# Patient Record
Sex: FEMALE | Race: WHITE | Employment: OTHER | URBAN - METROPOLITAN AREA
[De-identification: names, ages, dates, MRNs, and addresses within clinical notes are randomized per-mention and may not be internally consistent; named-entity substitution may affect disease eponyms.]

---

## 2018-08-23 NOTE — H&P
Jemima Buciobecky  2018 10:47 AM  Location: The [address] [place] [address]  Patient #: [id number]  : 1940   / Language: English / Race: White  Female      History of Present Illness   The patient is a 66year old female who presents for a recheck of Finger Problem. The patient describes having dull ache. The problem is described as being located in the left thumb, left index finger (MCP joint) and left long finger (recheck for possible left index and long metacarpal phalangeal joint arthroplasties. on 2018.). The symptom has been occuring for months. The symptom occurs in a persistent pattern. The course has been worsening. The finger problem is described as moderate. The finger problem is aggravated by other (everyday use). The finger problem is relieved by medication (Advil). The symptoms have been associated with painful ROM,  while the symptoms have not been associated with decreased ROM, numbness, tingling or swelling. The patient's dominant hand is their right. Note: I have reviewed the patient's reason for visit, review of systems, and past medical and social history as documented by my staff.  Pertinent items were discussed with the patient. Problem List/Past Medical   Thyroid cancer (193  C73)    Pacemaker (V45.01  Z95.0)    Hypercholesteremia (272.0  E78.00)    Arthritis (716.90) (716.90  M19.90)    Aortic aneurysm (441.9  I71. 9)   just being watched right now  Smoking history (V15.82  Z72.0)    Trigger finger (727.03) (727.03  M65.30)    Tenosynovitis of finger, hand, or wrist (727.05  M65.9)    Acute bronchitis (466.0  J20. 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     Family History   Family history unknown      Social History   No Drug Use    Alcohol Use   Drinks hard liquor. 2/week  Tobacco Use   Current every day smoker, Has been smoking for 30+ years, Smokes 1 pack of cigarettes per day. Medication History   AmLODIPine Besylate  (2.5MG Tablet, Oral every other day) Active. Gabapentin  (300MG Capsule, Oral prn) Active. Lipitor  (40MG Tablet, Oral daily) Active. Synthroid  (100MCG Tablet, Oral daily) Active. Vitamin D  (2000UNIT Tablet, Oral daily) Active. Zoloft  (100MG Tablet, Oral daily) Active. Medications Reconciled     Past Surgical History   Carpal Tunnel Release    Joint Replacement-Right Hand   index and long MCP joints  Carpal Tunnel Surgery - Left   Date: 11/29/2011. Knee replacement (V43.65)   Left. Diagnostic Studies History   EMG/NCS   Date: 10/31/2011, Results: . This is an abnormal study with electrical evidence of a lesion affecting the sensory and motor fibers of the left median nerve at the level of the wrist, as would be seen in a moderately severe left carpal tunnel syndrome. Hand X-ray   Date: 7/13/2011, Results: Kaleb Spies Mild narrowing of the first and third metacarpophalangeal joints of the right hand. No significant joint space narrowing of the left wrist and hand. No joint erosions are identified. Review of Systems   General Not Present- Chills and Fever. Skin Not Present- Bruising, Pallor and Skin Color Changes. Respiratory Not Present- Cough and Difficulty Breathing. Cardiovascular Not Present- Chest Pain and Fainting / Blacking Out. Musculoskeletal Present- Decreased Range of Motion, Joint Pain and Joint Swelling. Neurological Not Present- Dysesthesia, Paresthesias and Weakness In Extremities. Hematology Not Present- Abnormal Bleeding and Petechiae. Physical Exam  General  Mental Status - Alert. General Appearance - Cooperative and Well groomed, Not in acute distress, Not Sickly. Orientation - Oriented X3. Build & Nutrition - Well nourished and Well developed.   Posture - Normal posture. Gait - Normal.  Hydration - Well hydrated. Voice - Normal.    Integumentary  General Characteristics  Color - normal coloration of skin. Skin Moisture - normal skin moisture. Texture - normal skin texture. Chest and Lung Exam  Inspection  Chest Wall - Normal. Shape - Normal and Symmetric. Movements - Symmetrical. Accessory muscles - No use of accessory muscles in breathing. Auscultation  Breath sounds - Normal. Adventitious sounds - No Adventitious sounds. Cardiovascular  Auscultation  Heart Sounds - S1 WNL and S2 WNL, No S3. Murmurs & Other Heart Sounds - Auscultation of the heart reveals - No Murmurs. Abdomen  Inspection - Inspection Normal.    Musculoskeletal  Upper Extremity    Hand/Wrist: Hand - Evaluation of related systems reveals - no digital clubbing or cyanosis and neurovascularly intact bilaterally. Inspection and Palpation - Tenderness - no tenderness to palpation, no pain (L). Crepitus - no crepitus (L). Sensation is - normal, (L). Instability - Left - no instability or laxity. Hand - Deformities/Malalignments/Discrepancies - no deformities, malalignments, or discrepancies. Functional Testing - Grind Test positive, (L). Phalanges:  Left:  Thumb: Inspection and Palpation - Tenderness - moderate and over the basilar joint. Swelling - bony. Thumb - Functional Testing - Flexor Pollicis Longus is intact. Index Finger: Inspection and Palpation - Tenderness - moderate and over the MCP joint. Swelling - boggy and fluid. Index Finger - Functional Testing - Flexor Digitorum superficialis is intact and Flexor Digitorum Profundus is intact. Long Finger: Inspection and Palpation - Tenderness - mild and over the MCP joint. Swelling - boggy and bony. Long Finger - Functional Testing - Flexor Digitorum Superficialis is intact and Flexor Digitorum Profundus is intact. Ring Finger - Functional Testing - Flexor Digitorum Superficialis is intact and Flexor Digitorum Profundus is intact.  Small Finger - Functional Testing - Flexor Digitorum Superficialis is intact and Flexor Digitorum Profundus is intact. Assessment & Plan  Arthralgia of metacarpophalangeal joint, left (719.44  M25.542)  Impression: She has evidence of metacarpophalangeal joint osteoarthritis of index and long. We will proceed with conservative treatment for her loose for some period of time and she is now having increasing symptoms. She would like to proceed with metacarpophalangeal joint arthroplasties. We discussed with her the wrist and benefits of surgery. We discussed failure of implants. We discussed continued pain. She states she understands and agrees to proceed. Current Plans  The procedure was discussed with the patient and a written consent was obtained and questions were answered.  Risks of the procedure were discussed and include but are not limited to infection, bleeding, nerve, vascular injury as well as the need for future procedures.  It was also discussed the importance of compliance with the treatment directions and participation in the care of the treated extremity. Patient wishes to proceed with the planned Left index and long finger metacarpal phalangeal joint arthroplasties. X-RAY EXAM OF HAND (96972) Left Hand 3 Views  X-Ray Findings: Two views of the left hand were obtained.  The radiographs show evidence of basal joint arthritis.  Further there is evidence of metacarpophalangeal joint osteoarthritis of the thumb.  At the metacarpophalangeal joint of index and long there is evidence of osteophyte formation.  There is evidence of sclerosis.  The joint space is narrowed.  Findings are consistent with osteoarthritis. Osteoarthritis of carpometacarpal (CMC) joint of right thumb (715.34  M18.11)  Impression: She has evidence of basal joint arthritis as well as metacarpophalangeal joint osteoarthritis of her thumb. Our plan was to proceed with the metacarpophalangeal joint arthroplasties.  However she is also having increasing symptoms of the thumb and would like to go and proceed with her basilar joint surgery at the same time. She has undergone this previously before on the other side. She would like to proceed with that on the left side. Again we discussed risks and benefits and the fact that she subjecting herself to more pain as well is increased risk of swelling. She states she understands and agrees to proceed. Current Plans  The procedure was discussed with the patient and a written consent was obtained and questions were answered.  Risks of the procedure were discussed and include but are not limited to infection, bleeding, nerve, vascular injury as well as the need for future procedures.  It was also discussed the importance of compliance with the treatment directions and participation in the care of the treated extremity. Patient wishes to proceed with the planned basilar joint arthroplasty. List of current medications documented by the Provider ()  Pt Education - General Patient Education: discussed with patient and provided information. Note: Voice recognition software may have been used to generate this report, which may have resulted in some phonetic based errors in grammar and contents.  Even though attempts were made to correct all the mistakes, some may have been missed, and remain in the body of the document.         Signed by Bentley Mclain MD

## 2018-08-24 RX ORDER — ATORVASTATIN CALCIUM 40 MG/1
40 TABLET, FILM COATED ORAL
COMMUNITY

## 2018-08-24 RX ORDER — CHOLECALCIFEROL (VITAMIN D3) 125 MCG
CAPSULE ORAL
COMMUNITY

## 2018-08-24 RX ORDER — AMLODIPINE BESYLATE 2.5 MG/1
TABLET ORAL EVERY OTHER DAY
COMMUNITY

## 2018-08-24 RX ORDER — LEVOTHYROXINE SODIUM 100 UG/1
100 TABLET ORAL
COMMUNITY

## 2018-08-24 RX ORDER — SERTRALINE HYDROCHLORIDE 100 MG/1
100 TABLET, FILM COATED ORAL
COMMUNITY

## 2018-08-24 NOTE — PERIOP NOTES
PAT - SURGICAL PRE-ADMISSION INSTRUCTIONS    NAME:  Logan Glover DATE:  8/24/2018    SURGERY DATE:  8/28/2018                                  SURGERY ARRIVAL TIME:   0615    1. Do NOT eat or drink anything, including candy or gum, after MIDNIGHT on 8/27/18 , unless you have specific instructions from your Surgeon or Anesthesia Provider to do so. 2. No smoking on the day of surgery. 3. No alcohol 24 hours prior to the day of surgery. 4. No recreational drugs for one week prior to the day of surgery. 5. Leave all valuables, including money/purse, at home. 6. Remove all jewelry, nail polish, makeup (including mascara); no lotions, powders, deodorant, or perfume/cologne/after shave. 7. Glasses/Contact lenses and Dentures may be worn to the hospital.  They will be removed prior to surgery. 8. Call your doctor if symptoms of a cold or illness develop within 24 ours prior to surgery. 9. AN ADULT MUST DRIVE YOU HOME AFTER OUTPATIENT SURGERY. 10. If you are having an OUTPATIENT procedure, please make arrangements for a responsible adult to be with you for 24 hours after your surgery. 11. If you are admitted to the hospital, you will be assigned to a bed after surgery is complete. Normally a family member will not be able to see you until you are in your assigned bed. 15. Family is encouraged to accompany you to the hospital.  We ask visitors in the treatment area to be limited to ONE person at a time to ensure patient privacy. EXCEPTIONS WILL BE MADE AS NEEDED. 15. Children under 12 are discouraged from entering the treatment area and need to be supervised by an adult when in the waiting room. Special Instructions:    NONE. Patient Prep:    shower with anti-bacterial soap    These surgical instructions were reviewed with PATIENT during the PAT PHONE CALL. Directions: On the morning of surgery, please go to the [facility]. Enter the building from the [facility] entrance, 1st floor (next to the Emergency Room entrance). Take the elevator to the 2nd floor. Sign in at the Registration Desk.     If you have any questions and/or concerns, please do not hesitate to call:  (Prior to the day of surgery)  [facility] [place] unit:  [phone]  (Day of surgery)  [facility] unit:  [phone]

## 2018-08-27 ENCOUNTER — ANESTHESIA EVENT (OUTPATIENT)
Dept: SURGERY | Age: 78
End: 2018-08-27
Payer: MEDICARE

## 2018-08-28 ENCOUNTER — HOSPITAL ENCOUNTER (OUTPATIENT)
Age: 78
Setting detail: OUTPATIENT SURGERY
Discharge: HOME | End: 2018-08-28
Attending: ORTHOPAEDIC SURGERY | Admitting: ORTHOPAEDIC SURGERY
Payer: MEDICARE

## 2018-08-28 ENCOUNTER — ANESTHESIA (OUTPATIENT)
Dept: SURGERY | Age: 78
End: 2018-08-28
Payer: MEDICARE

## 2018-08-28 VITALS
HEIGHT: 64 IN | BODY MASS INDEX: 23.9 KG/M2 | DIASTOLIC BLOOD PRESSURE: 68 MMHG | TEMPERATURE: 97 F | SYSTOLIC BLOOD PRESSURE: 142 MMHG | OXYGEN SATURATION: 92 % | RESPIRATION RATE: 18 BRPM | WEIGHT: 140 LBS | HEART RATE: 62 BPM

## 2018-08-28 DIAGNOSIS — M18.12 ARTHRITIS OF CARPOMETACARPAL (CMC) JOINT OF LEFT THUMB: Primary | Chronic | ICD-10-CM

## 2018-08-28 DIAGNOSIS — M19.042 ARTHRITIS OF LEFT HAND: Chronic | ICD-10-CM

## 2018-08-28 DIAGNOSIS — M25.542 ARTHRALGIA OF METACARPOPHALANGEAL JOINT, LEFT: Chronic | ICD-10-CM

## 2018-08-28 PROCEDURE — 77030032490 HC SLV COMPR SCD KNE COVD -B: Performed by: ORTHOPAEDIC SURGERY

## 2018-08-28 PROCEDURE — 76010000131 HC OR TIME 2 TO 2.5 HR: Performed by: ORTHOPAEDIC SURGERY

## 2018-08-28 PROCEDURE — 74011250636 HC RX REV CODE- 250/636: Performed by: ANESTHESIOLOGY

## 2018-08-28 PROCEDURE — 77030028097 HC BLD/TPS DRL PK DIS INLC -E: Performed by: ORTHOPAEDIC SURGERY

## 2018-08-28 PROCEDURE — 76942 ECHO GUIDE FOR BIOPSY: CPT | Performed by: ANESTHESIOLOGY

## 2018-08-28 PROCEDURE — 74011250636 HC RX REV CODE- 250/636

## 2018-08-28 PROCEDURE — 74011250636 HC RX REV CODE- 250/636: Performed by: PHYSICIAN ASSISTANT

## 2018-08-28 PROCEDURE — C1776 JOINT DEVICE (IMPLANTABLE): HCPCS | Performed by: ORTHOPAEDIC SURGERY

## 2018-08-28 PROCEDURE — 77030002922 HC SUT FBRWRE ARTH -B: Performed by: ORTHOPAEDIC SURGERY

## 2018-08-28 PROCEDURE — 77030006689 HC BLD OPHTH BVR BD -A: Performed by: ORTHOPAEDIC SURGERY

## 2018-08-28 PROCEDURE — 76060000035 HC ANESTHESIA 2 TO 2.5 HR: Performed by: ORTHOPAEDIC SURGERY

## 2018-08-28 PROCEDURE — 76210000021 HC REC RM PH II 0.5 TO 1 HR: Performed by: ORTHOPAEDIC SURGERY

## 2018-08-28 PROCEDURE — 74011250636 HC RX REV CODE- 250/636: Performed by: NURSE ANESTHETIST, CERTIFIED REGISTERED

## 2018-08-28 PROCEDURE — 74011000272 HC RX REV CODE- 272: Performed by: ORTHOPAEDIC SURGERY

## 2018-08-28 PROCEDURE — 77030018836 HC SOL IRR NACL ICUM -A: Performed by: ORTHOPAEDIC SURGERY

## 2018-08-28 PROCEDURE — 64415 NJX AA&/STRD BRCH PLXS IMG: CPT | Performed by: ANESTHESIOLOGY

## 2018-08-28 PROCEDURE — 74011000250 HC RX REV CODE- 250: Performed by: ORTHOPAEDIC SURGERY

## 2018-08-28 PROCEDURE — 77030002933 HC SUT MCRYL J&J -A: Performed by: ORTHOPAEDIC SURGERY

## 2018-08-28 RX ORDER — CEFAZOLIN SODIUM 2 G/50ML
2 SOLUTION INTRAVENOUS ONCE
Status: COMPLETED | OUTPATIENT
Start: 2018-08-28 | End: 2018-08-28

## 2018-08-28 RX ORDER — LIDOCAINE HYDROCHLORIDE 20 MG/ML
INJECTION, SOLUTION EPIDURAL; INFILTRATION; INTRACAUDAL; PERINEURAL AS NEEDED
Status: DISCONTINUED | OUTPATIENT
Start: 2018-08-28 | End: 2018-08-28 | Stop reason: HOSPADM

## 2018-08-28 RX ORDER — PROPOFOL 10 MG/ML
INJECTION, EMULSION INTRAVENOUS AS NEEDED
Status: DISCONTINUED | OUTPATIENT
Start: 2018-08-28 | End: 2018-08-28 | Stop reason: HOSPADM

## 2018-08-28 RX ORDER — FAMOTIDINE 20 MG/1
20 TABLET, FILM COATED ORAL ONCE
Status: DISCONTINUED | OUTPATIENT
Start: 2018-08-29 | End: 2018-08-28 | Stop reason: HOSPADM

## 2018-08-28 RX ORDER — SODIUM CHLORIDE 0.9 % (FLUSH) 0.9 %
5-10 SYRINGE (ML) INJECTION EVERY 8 HOURS
Status: DISCONTINUED | OUTPATIENT
Start: 2018-08-28 | End: 2018-08-28 | Stop reason: HOSPADM

## 2018-08-28 RX ORDER — SODIUM CHLORIDE 0.9 % (FLUSH) 0.9 %
5-10 SYRINGE (ML) INJECTION AS NEEDED
Status: DISCONTINUED | OUTPATIENT
Start: 2018-08-28 | End: 2018-08-28 | Stop reason: HOSPADM

## 2018-08-28 RX ORDER — OXYCODONE AND ACETAMINOPHEN 5; 325 MG/1; MG/1
1 TABLET ORAL AS NEEDED
Status: CANCELLED | OUTPATIENT
Start: 2018-08-28

## 2018-08-28 RX ORDER — MIDAZOLAM HYDROCHLORIDE 1 MG/ML
2 INJECTION, SOLUTION INTRAMUSCULAR; INTRAVENOUS ONCE
Status: COMPLETED | OUTPATIENT
Start: 2018-08-28 | End: 2018-08-28

## 2018-08-28 RX ORDER — SODIUM CHLORIDE, SODIUM LACTATE, POTASSIUM CHLORIDE, CALCIUM CHLORIDE 600; 310; 30; 20 MG/100ML; MG/100ML; MG/100ML; MG/100ML
75 INJECTION, SOLUTION INTRAVENOUS CONTINUOUS
Status: CANCELLED | OUTPATIENT
Start: 2018-08-28

## 2018-08-28 RX ORDER — OXYCODONE AND ACETAMINOPHEN 10; 325 MG/1; MG/1
1-2 TABLET ORAL
Qty: 30 TAB | Refills: 0 | Status: SHIPPED | OUTPATIENT
Start: 2018-08-28

## 2018-08-28 RX ORDER — FENTANYL CITRATE 50 UG/ML
100 INJECTION, SOLUTION INTRAMUSCULAR; INTRAVENOUS ONCE
Status: COMPLETED | OUTPATIENT
Start: 2018-08-28 | End: 2018-08-28

## 2018-08-28 RX ORDER — FAMOTIDINE 20 MG/1
TABLET, FILM COATED ORAL
Status: DISCONTINUED
Start: 2018-08-28 | End: 2018-08-28 | Stop reason: HOSPADM

## 2018-08-28 RX ORDER — PROPOFOL 10 MG/ML
INJECTION, EMULSION INTRAVENOUS
Status: DISCONTINUED | OUTPATIENT
Start: 2018-08-28 | End: 2018-08-28 | Stop reason: HOSPADM

## 2018-08-28 RX ORDER — DIPHENHYDRAMINE HYDROCHLORIDE 50 MG/ML
25 INJECTION, SOLUTION INTRAMUSCULAR; INTRAVENOUS
Status: CANCELLED | OUTPATIENT
Start: 2018-08-28

## 2018-08-28 RX ORDER — SODIUM CHLORIDE, SODIUM LACTATE, POTASSIUM CHLORIDE, CALCIUM CHLORIDE 600; 310; 30; 20 MG/100ML; MG/100ML; MG/100ML; MG/100ML
50 INJECTION, SOLUTION INTRAVENOUS CONTINUOUS
Status: DISCONTINUED | OUTPATIENT
Start: 2018-08-29 | End: 2018-08-28 | Stop reason: HOSPADM

## 2018-08-28 RX ADMIN — MIDAZOLAM HYDROCHLORIDE 2 MG: 1 INJECTION, SOLUTION INTRAMUSCULAR; INTRAVENOUS at 08:07

## 2018-08-28 RX ADMIN — PROPOFOL 120 MCG/KG/MIN: 10 INJECTION, EMULSION INTRAVENOUS at 08:31

## 2018-08-28 RX ADMIN — SODIUM CHLORIDE, SODIUM LACTATE, POTASSIUM CHLORIDE, AND CALCIUM CHLORIDE 50 ML/HR: 600; 310; 30; 20 INJECTION, SOLUTION INTRAVENOUS at 07:46

## 2018-08-28 RX ADMIN — PROPOFOL 30 MG: 10 INJECTION, EMULSION INTRAVENOUS at 08:35

## 2018-08-28 RX ADMIN — CEFAZOLIN SODIUM 2 G: 2 SOLUTION INTRAVENOUS at 08:26

## 2018-08-28 RX ADMIN — LIDOCAINE HYDROCHLORIDE 100 MG: 20 INJECTION, SOLUTION EPIDURAL; INFILTRATION; INTRACAUDAL; PERINEURAL at 08:31

## 2018-08-28 RX ADMIN — PROPOFOL 20 MG: 10 INJECTION, EMULSION INTRAVENOUS at 08:45

## 2018-08-28 RX ADMIN — FENTANYL CITRATE 50 MCG: 50 INJECTION, SOLUTION INTRAMUSCULAR; INTRAVENOUS at 08:07

## 2018-08-28 NOTE — ANESTHESIA PREPROCEDURE EVALUATION
Anesthetic History   No history of anesthetic complications            Review of Systems / Medical History  Patient summary reviewed and pertinent labs reviewed    Pulmonary  Within defined limits                 Neuro/Psych   Within defined limits           Cardiovascular    Hypertension        Dysrhythmias       Exercise tolerance: >4 METS  Comments: Bradycardia s/p PPM   GI/Hepatic/Renal  Within defined limits              Endo/Other      Hypothyroidism  Arthritis and cancer    Comments: Thyroid ca s/p sx Other Findings   Comments: Documentation of current medication  Current medications obtained, documented and obtained? YES      Risk Factors for Postoperative nausea/vomiting:       History of postoperative nausea/vomiting? NO       Female? YES       Motion sickness? NO       Intended opioid administration for postoperative analgesia? YES      Smoking Abstinence:  Current Smoker? YES  Elective Surgery? YES  Seen preoperatively by anesthesiologist or proxy prior to day of surgery? YES  Pt abstained from smoking 24 hours prior to anesthesia?  NO    Preventive care/screening for High Blood Pressure:  Aged 18 years and older: YES  Screened for high blood pressure: YES  Patients with high blood pressure referred to primary care provider   for BP management: YES         Physical Exam    Airway  Mallampati: II  TM Distance: 4 - 6 cm  Neck ROM: normal range of motion   Mouth opening: Normal     Cardiovascular  Regular rate and rhythm,  S1 and S2 normal,  no murmur, click, rub, or gallop  Rhythm: regular  Rate: normal         Dental    Dentition: Lower partial plate and Upper partial plate     Pulmonary  Breath sounds clear to auscultation               Abdominal  GI exam deferred       Other Findings            Anesthetic Plan    ASA: 3  Anesthesia type: MAC, general - backup and regional          Induction: Intravenous  Anesthetic plan and risks discussed with: Patient

## 2018-08-28 NOTE — IP AVS SNAPSHOT
[address] [place] [address] Coco Amy Ballard 
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 
[phone] Bentley Mclain MD  If symptoms worsen, For suture removal, For wound re-check, as scheduled [address] [id number] [place] [address] [phone] Discharge Orders None A check armaan indicates which time of day the medication should be taken. My Medications START taking these medications Instructions Each Dose to Equal  
 Morning Noon Evening Bedtime  
 oxyCODONE-acetaminophen  mg per tablet Commonly known as:  PERCOCET 10 Your last dose was: Your next dose is: Take 1-2 Tabs by mouth every six (6) hours as needed for Pain. Max Daily Amount: 8 Tabs. 1-2 Tab CONTINUE taking these medications Instructions Each Dose to Equal  
 Morning Noon Evening Bedtime  
 amLODIPine 2.5 mg tablet Commonly known as:  Cass Fraction Your last dose was: Your next dose is: Take  by mouth every other day. LIPITOR 40 mg tablet Generic drug:  atorvastatin Your last dose was: Your next dose is: Take 40 mg by mouth nightly. 40 mg SYNTHROID 100 mcg tablet Generic drug:  levothyroxine Your last dose was: Your next dose is: Take 100 mcg by mouth nightly. 100 mcg VITAMIN D3 2,000 unit Tab Generic drug:  cholecalciferol (vitamin D3) Your last dose was: Your next dose is: Take  by mouth nightly. ZOLOFT 100 mg tablet Generic drug:  sertraline Your last dose was: Your next dose is: Take 100 mg by mouth nightly. 100 mg Where to Get Your Medications Information on where to get these meds will be given to you by the nurse or doctor. ! Ask your nurse or doctor about these medications  
  oxyCODONE-acetaminophen  mg per tablet Opioid Education Prescription Opioids: What You Need to Know: 
 
Prescription opioids can be used to help relieve moderate-to-severe pain and are often prescribed following a surgery or injury, or for certain health conditions. These medications can be an important part of treatment but also come with serious risks. Opioids are strong pain medicines. Examples include hydrocodone, oxycodone, fentanyl, and morphine. Heroin is an example of an illegal opioid. It is important to work with your health care provider to make sure you are getting the safest, most effective care. WHAT ARE THE RISKS AND SIDE EFFECTS OF OPIOID USE? Prescription opioids carry serious risks of addiction and overdose, especially with prolonged use. An opioid overdose, often marked by slow breathing, can cause sudden death. The use of prescription opioids can have a number of side effects as well, even when taken as directed. · Tolerance-meaning you might need to take more of a medication for the same pain relief · Physical dependence-meaning you have symptoms of withdrawal when the medication is stopped.   Withdrawal symptoms can include nausea, sweating, chills, diarrhea, stomach cramps, and muscle aches. Withdrawal can last up to several weeks, depending on which drug you took and how long you took it. · Increased sensitivity to pain · Constipation · Nausea, vomiting, and dry mouth · Sleepiness and dizziness · Confusion · Depression · Low levels of testosterone that can result in lower sex drive, energy, and strength · Itching and sweating RISKS ARE GREATER WITH:      
· History of drug misuse, substance use disorder, or overdose · Mental health conditions (such as depression or anxiety) · Sleep apnea · Older age (72 years or older) · Pregnancy Avoid alcohol while taking prescription opioids. Also, unless specifically advised by your health care provider, medications to avoid include: · Benzodiazepines (such as Xanax or Valium) · Muscle relaxants (such as Soma or Flexeril) · Hypnotics (such as Ambien or Lunesta) · Other prescription opioids KNOW YOUR OPTIONS Talk to your health care provider about ways to manage your pain that don't involve prescription opioids. Some of these options may actually work better and have fewer risks and side effects. Options may include: 
· Pain relievers such as acetaminophen, ibuprofen, and naproxen · Some medications that are also used for depression or seizures · Physical therapy and exercise · Counseling to help patients learn how to cope better with triggers of pain and stress. · Application of heat or cold compress · Massage therapy · Relaxation techniques Be Informed Make sure you know the name of your medication, how much and how often to take it, and its potential risks & side effects. IF YOU ARE PRESCRIBED OPIOIDS FOR PAIN: 
· Never take opioids in greater amounts or more often than prescribed. Remember the goal is not to be pain-free but to manage your pain at a tolerable level. · Follow up with your primary care provider to: · Work together to create a plan on how to manage your pain. · Talk about ways to help manage your pain that don't involve prescription opioids. · Talk about any and all concerns and side effects. · Help prevent misuse and abuse. · Never sell or share prescription opioids · Help prevent misuse and abuse. · Store prescription opioids in a secure place and out of reach of others (this may include visitors, children, friends, and family). · Safely dispose of unused/unwanted prescription opioids: Find your community drug take-back program or your pharmacy mail-back program, or flush them down the toilet, following guidance from the Food and Drug Administration (www.fda.gov/Drugs/ResourcesForYou). · Visit www.cdc.gov/drugoverdose to learn about the risks of opioid abuse and overdose. · If you believe you may be struggling with addiction, tell your health care provider and ask for guidance or call [employer] at 6-401-419-CTMG. Discharge Instructions Suzanne Linda PA-C Upper Extremity Surgery Discharge Instructions Please take the time to review the following instructions before you leave the hospital and use them as guidelines during your recovery from surgery. If you have any questions you may contact my office at [phone]. Wound Care/Dressing Changes:  
Dont remove your dressing or get them wet. It isnt necessary to apply antibiotic ointment to your incisions. Sutures will be removed at your one week post-op visit. Staples (if you have them) are removed in two weeks. If you have steri-strips over your incision they will start to peel off in 7-10 days as you get them wet. They dont need to be removed prior to that. When they begin to peel off, you may remove them. They should all be removed by 14 days from your surgery. Showering/Bathing: You may shower after your surgery. Your dressing may NOT be removed for showering. Do not take a bath or get into a swimming pool or Jacuzzi until the incisions are completely healed. This may take about 14 days. Do not soak your incision under water. Sling: You are not required to wear your sling and should do so only as needed for comfort. You have no restrictions with regards to the movement of your shoulder. Please push to achieve full range of motion as soon as possible. Please follow motion instructions given at the time of surgery. Ice/Elevation Continue ice consistently for 24 hours after surgery. After 48 hours, you should ice your hand 3 times per day, for 20 minutes at a time for the next 5 days. After one week from surgery, you may use ice as needed for pain and swelling. Elevate the extremity higher than your heart for the next 24 - 48 hours. If you get throbbing this is telling you to elevate the extremity. Diet:  
You may advance to your regular diet as tolerated. Medication:  
1. You will be given a prescription for pain medication when you are discharged from the hospital. Take the medication as needed according to the directions on the prescription bottle. Possible side effects of the medication include dizziness, headache, nausea, vomiting, constipation and urinary retention. If you experience any of these side effects call the office so that we can assist you in relieving them. Discontinue the use of the pain medication if you develop itching, rash, shortness of breath or difficulties swallowing. If these symptoms become severe or arent relieved by discontinuing the medication you should seek immediate medical attention. Refills of pain medication are authorized during office hours only. (7 AM-3PM Mon. thru Fri.) 2. If you were prescribed Percocet/oxycodone you must have a written prescription. These medications legally cannot be called in to the pharmacy. 3. You may take over the counter Ibuprofen/Advil/Aleve between dosages of your pain medication if needed. Do not take Tylenol in addition to your pain medication as most of the pain medication already contains Tylenol. Do not exceed 3000mg of Tylenol per day. Ex: (hydrocodone 5/325g= 325mg of Tylenol) 4. You may resume the medication you were taking prior to surgery. Pain medication may change the effects of any antidepressant medication you are taking. If you have any questions about possible interactions between your regular medications and the pain medication you should consult the physician who prescribes your regular medications. Follow-up:   Check the letter for Follow-up appointment or call [phone] for questions. DISCHARGE SUMMARY from Nurse PATIENT INSTRUCTIONS: 
 
After general anesthesia or intravenous sedation, for 24 hours or while taking prescription Narcotics: · Limit your activities · Do not drive and operate hazardous machinery · Do not make important personal or business decisions · Do  not drink alcoholic beverages · If you have not urinated within 8 hours after discharge, please contact your surgeon on call. Report the following to your surgeon: 
· Excessive pain, swelling, redness or odor of or around the surgical area · Temperature over 100.5 · Nausea and vomiting lasting longer than 4 hours or if unable to take medications · Any signs of decreased circulation or nerve impairment to extremity: change in color, persistent  numbness, tingling, coldness or increase pain · Any questions What to do at Home: These are general instructions for a healthy lifestyle: No smoking/ No tobacco products/ Avoid exposure to second hand smoke Surgeon General's Warning:  Quitting smoking now greatly reduces serious risk to your health. Obesity, smoking, and sedentary lifestyle greatly increases your risk for illness A healthy diet, regular physical exercise & weight monitoring are important for maintaining a healthy lifestyle You may be retaining fluid if you have a history of heart failure or if you experience any of the following symptoms:  Weight gain of 3 pounds or more overnight or 5 pounds in a week, increased swelling in our hands or feet or shortness of breath while lying flat in bed. Please call your doctor as soon as you notice any of these symptoms; do not wait until your next office visit. Recognize signs and symptoms of STROKE: 
 
F-face looks uneven A-arms unable to move or move unevenly S-speech slurred or non-existent T-time-call 911 as soon as signs and symptoms begin-DO NOT go Back to bed or wait to see if you get better-TIME IS BRAIN. Warning Signs of HEART ATTACK Call 911 if you have these symptoms: 
? Chest discomfort. Most heart attacks involve discomfort in the center of the chest that lasts more than a few minutes, or that goes away and comes back. It can feel like uncomfortable pressure, squeezing, fullness, or pain. ? Discomfort in other areas of the upper body. Symptoms can include pain or discomfort in one or both arms, the back, neck, jaw, or stomach. ? Shortness of breath with or without chest discomfort. ? Other signs may include breaking out in a cold sweat, nausea, or lightheadedness. Don't wait more than five minutes to call [address]! Fast action can save your life. Calling 911 is almost always the fastest way to get lifesaving treatment. Emergency Medical Services staff can begin treatment when they arrive  up to an hour sooner than if someone gets to the hospital by car. The discharge information has been reviewed with the patient. The patient verbalized understanding. Discharge medications reviewed with the patient and appropriate educational materials and side effects teaching were provided. _____________________________________________________________________________________________________________________Patient armband removed and given to patient to take home. Patient was informed of the privacy risks if armband lost or stolen 
______________ Introducing [facility]! [facility] introduces [employer] patient portal. Now you can access parts of your medical record, email your doctor's office, and request medication refills online. 1. In your internet browser, go to [email or web address][email or web address]/[email or web address] 2. Click on the First Time User? Click Here link in the Sign In box. You will see the New Member Sign Up page. 3. Enter your [employer] Access Code exactly as it appears below. You will not need to use this code after youve completed the sign-up process. If you do not sign up before the expiration date, you must request a new code. · [employer] Access Code: [email or web address] Expires: 11/21/2018 11:49 AM 
 
4. Enter the last four digits of your Social Security Number (xxxx) and Date of Birth (mm/dd/yyyy) as indicated and click Submit. You will be taken to the next sign-up page. 5. Create a [employer] ID. This will be your [employer] login ID and cannot be changed, so think of one that is secure and easy to remember. 6. Create a [employer] password. You can change your password at any time. 7. Enter your Password Reset Question and Answer. This can be used at a later time if you forget your password. 8. Enter your e-mail address. You will receive e-mail notification when new information is available in [address] 9. Click Sign Up. You can now view and download portions of your medical record. 10. Click the Download Summary menu link to download a portable copy of your medical information. If you have questions, please visit the Frequently Asked Questions section of the [employer] website. Remember, [employer] is NOT to be used for urgent needs. For medical emergencies, dial 911. Now available from your iPhone and Android! Introducing Dada Leiva As a [employer] patient, I wanted to make you aware of our electronic visit tool called Dada Leiva. [employer] allows you to connect within minutes with a medical provider 24 hours a day, seven days a week via a mobile device or tablet or logging into a secure website from your computer. You can access Dada Leiva from anywhere in the [place]. A virtual visit might be right for you when you have a simple condition and feel like you just dont want to get out of bed, or cant get away from work for an appointment, when your regular [employer] provider is not available (evenings, weekends or holidays), or when youre out of town and need minor care. Electronic visits cost only $49 and if the [employer] provider determines a prescription is needed to treat your condition, one can be electronically transmitted to a nearby pharmacy*. Please take a moment to enroll today if you have not already done so. The enrollment process is free and takes just a few minutes. To enroll, please download the [employer] tatyana to your tablet or phone, or visit [email or web address] to enroll on your computer. And, as an [address] [place] [address] patient with a [employer] account, the results of your visits will be scanned into your electronic medical record and your primary care provider will be able to view the scanned results. We urge you to continue to see your regular [facility] provider for your ongoing medical care. And while your primary care provider may not be the one available when you seek a Dada Leiva virtual visit, the peace of mind you get from getting a real diagnosis real time can be priceless. For more information on Dada Leiva, view our Frequently Asked Questions (FAQs) at [email or web address]. Sincerely, 
 
Xiomara Abdalla MD 
Chief Medical Officer [facility] Estefany Hines *:  certain medications cannot be prescribed via Dada Leiva Providers Seen During Your Hospitalization Provider Specialty Primary office phone Jeffry Bauer MD Orthopedic Surgery [phone] Your Primary Care Physician (PCP) Primary Care Physician Office Phone Office Fax [phone] [facility],[address], [address] [facility] [phone] You are allergic to the following Allergen Reactions Omeprazole Rash Itching Other Medication Other (comments) Reflux medicine Recent Documentation Height Weight BMI OB Status Smoking Status 1.626 m 63.5 kg 24.03 kg/m2 Postmenopausal Current Every Day Smoker Emergency Contacts Name Discharge Info Relation Home Work Mobile [address] CAREGIVER [3] Spouse [3] [phone] Patient Belongings The following personal items are in your possession at time of discharge: 
  Dental Appliances: None  Visual Aid: Glasses, At home   Hearing Aids/Status: At home  Home Medications: None   Jewelry: None  Clothing: Pants, Shirt, Footwear, Undergarments    Other Valuables: None Please provide this summary of care documentation to your next provider. Signatures-by signing, you are acknowledging that this After Visit Summary has been reviewed with you and you have received a copy. Patient Signature:  ____________________________________________________________ Date:  ____________________________________________________________  
  
Kelsey Okeefe Provider Signature:  ____________________________________________________________ Date:  ____________________________________________________________

## 2018-08-28 NOTE — ANESTHESIA PROCEDURE NOTES
Peripheral Block    Start time: 8/28/2018 8:05 AM  End time: 8/28/2018 8:11 AM  Performed by: Arie Wheat  Authorized by: Arie Wheat       Pre-procedure:    Indications: at surgeon's request and post-op pain management    Preanesthetic Checklist: patient identified, risks and benefits discussed, site marked, timeout performed, anesthesia consent given and patient being monitored    Timeout Time: 08:05          Block Type:   Block Type:  Supraclavicular  Laterality:  Left  Monitoring:  Standard ASA monitoring, responsive to questions, oxygen, continuous pulse ox, frequent vital sign checks and heart rate  Injection Technique:  Single shot  Procedures: ultrasound guided    Patient Position: supine  Prep: chlorhexidine    Location:  Supraclavicular  Needle Type:  Ultraplex  Needle Gauge:  22 G  Needle Localization:  Ultrasound guidance  Medication Injected:  0.5%  ropivacaine  Volume (mL):  30    Assessment:  Number of attempts:  1  Injection Assessment:  Incremental injection every 5 mL, negative aspiration for CSF, no paresthesia, ultrasound image on chart, no intravascular symptoms, negative aspiration for blood and local visualized surrounding nerve on ultrasound  Patient tolerance:  Patient tolerated the procedure well with no immediate complications

## 2018-08-28 NOTE — BRIEF OP NOTE
BRIEF OPERATIVE NOTE 
 
 
BRIEF OPERATIVE NOTE Patient: Sonia Velasco MRN: [id number]  CSN: [id number] YOB: 1940  Age: 66 y.o. Sex: female Date of Procedure: 8/28/2018 Preoperative Diagnosis: L INDEX AND LONG MCP JOINT ARTHRITIS M25.542 M19.049, left thumb basilar joint arthritis m19.049,m79.642 Postoperative Diagnosis: L INDEX AND LONG MCP JOINT ARTHRITIS M25.542 M19.049, left thumb basilar joint arthritis m19.049,m79.642 Procedure: Procedure(s): LEFT HAND INDEX AND LONG FINGERS MCP JOINT ARTHROPLASTIES (REGIONAL), left basilar joint arthroplasty Surgeon: Buzz Barnhart MD  
  
First Assistant:   Amada Lutz Anesthesia Staff: Anesthesiologist: Abdiel Jung MD 
CRNA: Buena Duverney, CRNA Anesthesia: Regional   
 
Local Used: 0 2% lidocaine and 0.05 % marcaine  50:50 mix Fluid:  750 cc crystalloid Tourniquet Time:   96 minutes @  200  mmHg Total Tourniquet Time: Total Tourniquet Time Documented: 
Upper Arm (Right) - 97 minutes Total: Upper Arm (Right) - 97 minutes Estimated Blood Loss: < 20 cc Specimens: none Implants:  
Implant Name Type Inv. Item Serial No.  Lot No. LRB No. Used  
proximal finger implant Other  [id number] [employer] [id number] Left 1  
DISTAL FINGER IMPLANT Other  [id number] [employer] [id number] Left 1  
DISTAL FINGER IMPLANT Other  [id number] [employer] [id number] Left 1 PROXIMAL FINGER IMPLANT     [id number] [employer] [id number] Left 1 Findings: significant OA with exposed bone at MCP heads and trapezium Complications: None; patient tolerated the procedure well. Buzz Barnhart MD 
8/28/2018 10:26 AM 
 
 
.

## 2018-08-28 NOTE — ANESTHESIA POSTPROCEDURE EVALUATION
Post-Anesthesia Evaluation and Assessment Patient: Yo Viveros MRN: [id number]  SSN: [id number] YOB: 1940  Age: 66 y.o. Sex: female Cardiovascular Function/Vital Signs Visit Vitals  /68  Pulse 62  Temp 36.1 °C (97 °F)  Resp 18  Ht 5' 4\" (1.626 m)  Wt 63.5 kg (140 lb)  SpO2 92%  BMI 24.03 kg/m2 Patient is status post regional, general anesthesia for Procedure(s): LEFT HAND INDEX AND LONG FINGERS MCP JOINT ARTHROPLASTIES (REGIONAL), left basilar joint arthroplasty . Nausea/Vomiting: None Postoperative hydration reviewed and adequate. Pain: 
Pain Scale 1: Numeric (0 - 10) (08/28/18 1052) Pain Intensity 1: 0 (08/28/18 1052) Managed Neurological Status:  
Neuro (WDL): Within Defined Limits (08/28/18 0737) At baseline Mental Status and Level of Consciousness: Alert and oriented Pulmonary Status:  
O2 Device: Room air (08/28/18 1052) Adequate oxygenation and airway patent Complications related to anesthesia: None Post-anesthesia assessment completed. No concerns Signed By: Marilyn Carver CRNA August 28, 2018

## 2018-08-28 NOTE — PERIOP NOTES
Phase 2 Recovery Summary Patient arrived to Phase 2 at on a stretcher w/AUGUSTINE Blandon and OR Nurse. (Report rec'd) Vitals:  
 08/24/18 1115 08/28/18 0736 08/28/18 0809 08/28/18 1052 BP:  141/80 160/76 142/68 Pulse:  65 66 62 Resp:  18  18 Temp:  97 °F (36.1 °C) SpO2:  98% 100% 92% Weight: 65.3 kg (144 lb) 63.5 kg (140 lb) Height: 5' 4\" (1.626 m) 5' 4\" (1.626 m)    
 
 
oriented to time, place, person and situation Lines and Drains Peripheral Intravenous Line:   
 
Wound Wound Hand Right (Active) DRESSING STATUS Clean, dry, and intact 8/28/2018 11:00 AM  
DRESSING TYPE Elastic bandage 8/28/2018 11:00 AM  
SPLINT TYPE/MATERIAL Cast, fiberglass 8/28/2018 11:00 AM  
Incision site well approximated? Yes 8/28/2018  9:00 AM  
Number of days:0 Patient discharged to home with  in a private vehicle via [facility] w/nurse AMBER Vega

## 2018-08-28 NOTE — INTERVAL H&P NOTE
H&P Update:  Daxa Kaye was seen and examined. History and physical has been reviewed. The patient has been examined. There have been no significant clinical changes since the completion of the originally dated History and Physical.  Patient identified by surgeon; surgical site was confirmed by patient and surgeon.     Signed By: Jesse Walton MD     August 28, 2018 7:20 AM

## 2018-08-28 NOTE — DISCHARGE INSTRUCTIONS
Kolton Howard PA-C   Upper Extremity Surgery   Discharge Instructions   Please take the time to review the following instructions before you leave the hospital and use them as guidelines during your recovery from surgery. If you have any questions you may contact my office at [phone]. Wound Care/Dressing Changes:   Dont remove your dressing or get them wet. It isnt necessary to apply antibiotic ointment to your incisions. Sutures will be removed at your one week post-op visit. Staples (if you have them) are removed in two weeks. If you have steri-strips over your incision they will start to peel off in 7-10 days as you get them wet. They dont need to be removed prior to that. When they begin to peel off, you may remove them. They should all be removed by 14 days from your surgery. Showering/Bathing: You may shower after your surgery. Your dressing may NOT be removed for showering. Do not take a bath or get into a swimming pool or Jacuzzi until the incisions are completely healed. This may take about 14 days. Do not soak your incision under water. Sling: You are not required to wear your sling and should do so only as needed for comfort. You have no restrictions with regards to the movement of your shoulder. Please push to achieve full range of motion as soon as possible. Please follow motion instructions given at the time of surgery. Ice/Elevation   Continue ice consistently for 24 hours after surgery. After 48 hours, you should ice your hand 3 times per day, for 20 minutes at a time for the next 5 days. After one week from surgery, you may use ice as needed for pain and swelling. Elevate the extremity higher than your heart for the next 24 - 48 hours. If you get throbbing this is telling you to elevate the extremity. Diet:   You may advance to your regular diet as tolerated. Medication:   1.  You will be given a prescription for pain medication when you are discharged from the hospital. Take the medication as needed according to the directions on the prescription bottle. Possible side effects of the medication include dizziness, headache, nausea, vomiting, constipation and urinary retention. If you experience any of these side effects call the office so that we can assist you in relieving them. Discontinue the use of the pain medication if you develop itching, rash, shortness of breath or difficulties swallowing. If these symptoms become severe or arent relieved by discontinuing the medication you should seek immediate medical attention. Refills of pain medication are authorized during office hours only. (7 AM-3PM Mon. thru Fri.)    2. If you were prescribed Percocet/oxycodone you must have a written prescription. These medications legally cannot be called in to the pharmacy. 3. You may take over the counter Ibuprofen/Advil/Aleve between dosages of your pain medication if needed. Do not take Tylenol in addition to your pain medication as most of the pain medication already contains Tylenol. Do not exceed 3000mg of Tylenol per day. Ex: (hydrocodone 5/325g= 325mg of Tylenol)  4. You may resume the medication you were taking prior to surgery. Pain medication may change the effects of any antidepressant medication you are taking. If you have any questions about possible interactions between your regular medications and the pain medication you should consult the physician who prescribes your regular medications. Follow-up:   Check the letter for Follow-up appointment or call [phone] for questions.        DISCHARGE SUMMARY from Nurse    PATIENT INSTRUCTIONS:    After general anesthesia or intravenous sedation, for 24 hours or while taking prescription Narcotics:  · Limit your activities  · Do not drive and operate hazardous machinery  · Do not make important personal or business decisions  · Do  not drink alcoholic beverages  · If you have not urinated within 8 hours after discharge, please contact your surgeon on call. Report the following to your surgeon:  · Excessive pain, swelling, redness or odor of or around the surgical area  · Temperature over 100.5  · Nausea and vomiting lasting longer than 4 hours or if unable to take medications  · Any signs of decreased circulation or nerve impairment to extremity: change in color, persistent  numbness, tingling, coldness or increase pain  · Any questions    What to do at Home:    These are general instructions for a healthy lifestyle:    No smoking/ No tobacco products/ Avoid exposure to second hand smoke  Surgeon General's Warning:  Quitting smoking now greatly reduces serious risk to your health. Obesity, smoking, and sedentary lifestyle greatly increases your risk for illness    A healthy diet, regular physical exercise & weight monitoring are important for maintaining a healthy lifestyle    You may be retaining fluid if you have a history of heart failure or if you experience any of the following symptoms:  Weight gain of 3 pounds or more overnight or 5 pounds in a week, increased swelling in our hands or feet or shortness of breath while lying flat in bed. Please call your doctor as soon as you notice any of these symptoms; do not wait until your next office visit. Recognize signs and symptoms of STROKE:    F-face looks uneven    A-arms unable to move or move unevenly    S-speech slurred or non-existent    T-time-call 911 as soon as signs and symptoms begin-DO NOT go       Back to bed or wait to see if you get better-TIME IS BRAIN. Warning Signs of HEART ATTACK     Call 911 if you have these symptoms:   Chest discomfort. Most heart attacks involve discomfort in the center of the chest that lasts more than a few minutes, or that goes away and comes back. It can feel like uncomfortable pressure, squeezing, fullness, or pain.  Discomfort in other areas of the upper body.  Symptoms can include pain or discomfort in one or both arms, the back, neck, jaw, or stomach.  Shortness of breath with or without chest discomfort.  Other signs may include breaking out in a cold sweat, nausea, or lightheadedness. Don't wait more than five minutes to call 911 - MINUTES MATTER! Fast action can save your life. Calling 911 is almost always the fastest way to get lifesaving treatment. Emergency Medical Services staff can begin treatment when they arrive -- up to an hour sooner than if someone gets to the hospital by car. The discharge information has been reviewed with the patient. The patient verbalized understanding. Discharge medications reviewed with the patient and appropriate educational materials and side effects teaching were provided. _____________________________________________________________________________________________________________________Patient armband removed and given to patient to take home.   Patient was informed of the privacy risks if armband lost or stolen  ______________

## 2018-08-29 NOTE — OP NOTES
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 PROCEDURES PERFORMED:   1. Metacarpophalangeal joint arthroplasties of index and long. 2.  Basilar joint arthroplasty with trapeziectomy and abductor pollicis longus tendon transfer and interposition. SURGEON:  Jeison Peña MD    ASSISTANT:  Sintia Rodríguez    ANESTHESIA:  Regional and general.    TOURNIQUET TIME:  96 minutes at 200 mmHg. FLUID:  750 mL crystalloid. DRAINS:  None. COMPLICATIONS:  None. ESTIMATED BLOOD LOSS:  < 10    SPECIMENS REMOVED:  None. IMPLANTS:  Left long finger, 30T, 30D, lot #[id number] and lot #[id number]  IMPLANTS:  Left index finger, proximal finger implant 20P lot #[id number]   DISTAL FINGER IMPLANT: 20D lot #[id number]. INDICATIONS FOR PROCEDURE:  The patient is a very pleasant 70-year-old female who has had previous surgery on her right hand. She now wants to proceed with the same on the left. She has failed conservative treatment of arthritis. Despite initial responses to conservative management, symptoms have now reached the point that she wants to proceed with surgical intervention. Risks and benefits of surgery were discussed with her including but not limited to infection, bleeding, neurovascular injury, failure of the implants, sounds from the implants and need for future procedures as well as continued pain. She states she understands and agrees to proceed. NARRATION OF PROCEDURE:  After proper consent was obtained, the thumb and the left index finger and left long finger were marked.   She received regional anesthesia. She was brought to the OR, received IV sedation general.  Limb was prepped and draped. Proper timeout was taken for each procedure. Limb was elevated and exsanguinated, tourniquet was inflated to 200 mmHg and was up for 96 minute duration. 1.  Metacarpophalangeal joint arthroplasty to the left long finger. After exsanguination, a transverse incision was made overlying the index and long. Sharp dissection through the skin followed by blunt dissection. The extensor mechanism was exposed. Care was taken to protect the neurovascular bundles that run between the metacarpal heads. We then incised the radial aspect of the sagittal band of the long finger. We then  this from the capsular layer and then exposed the metacarpophalangeal joint. There was marked arthritis on the volar aspect of the metacarpophalangeal joint of the metacarpal head. We used the canal finder and then placed the cutting guide and then made our proximal cut. We then exposed the proximal portion of the proximal phalanx. We protected the soft tissues using Hohmann's as well as a Bergoo elevator and then we used the canal finder and then used the centralizing cutting jig and then proceeded with cutting the proximal portion of the proximal phalanx. We then used the Cole bur and sized the distal component. It sized to a 30. We then proceeded with sizing proximally to a 30. We then used the 30 trials. We were pleased with the position. We copiously irrigated. We got the 30 implants. We put in the final implants. We impacted these. We proceeded with capsular closure using Monocryl and then closure of the extensor mechanism using 2-0 FiberWire as well as 4-0 FiberWire. We then proceeded with the next digit procedure. 2.  Metacarpophalangeal joint arthroplasty to index. We then incised the radial aspect of the sagittal band overlying the index.   Sharp dissection through the sagittal band followed by the capsule. We then exposed the joint. The arthritis was more diffuse in terms of the proximal portion of the distal phalanx as well as the metacarpal head. We used the canal finder on the metacarpal head with the starter awl, followed by the centralizing jig for the proximal osteotomy. We then proceeded with our osteotomy. We then protected again the soft tissues as we had using the [facility] as well as the [facility] and did the proximal core cut of the proximal phalanx. We then used the [place]. We then sized distally to a 20 implant and then proximally we again used the sequential rasp and brought it up to a 20 proximal.  This fit nicely. There was no evidence of any subluxation. There was no evidence of any instability even before the soft tissues were closed. We then proceeded with closing the capsule and then closing the sagittal band using the 2-0 and 4-0 FiberWire. The tourniquet was released ultimately and the wound was closed using 4-0 nylon. 3.  We then proceeded with the basilar joint arthroplasty again confirming timeouts. A longitudinal incision was made at the base of the thumb overlying the metacarpal trapezial articulation. Sharp dissection through the skin followed by blunt dissection. Care was taken to protect the branches of the superficial radial nerve as well as lateral antebrachial cutaneous. Extensor pollicis brevis was isolated and released from its tendon sheath as was abductor pollicis longus. We then isolated the radial artery. We then bipolared the 2 branches from the radial artery to the trapezial capsule. The trapezial capsule was then incised. Subcapsular dissection was performed. We then removed the trapezium in piecemeal fashion. We then copiously irrigated. We then made a bur hole using the bur and then we went proximally and harvested a slip of the abductor pollicis longus.   We then transferred it distally into our wound and brought it through the bur trough that we had made. We then sutured it to the FCR splitting the FCR and bringing it back upon itself and then placing multiple sutures at this location and then bringing the final limb back down. Each limb was checked. We had a nice suspension arthroplasty with each limb. The base of the thumb was stable. We then proceeded with closing the capsule using Monocryl. The tourniquet was released. Bipolar was used for hemostasis. The wound was closed using Monocryl with a subcuticular. Xeroform dressing, fluffs, bulky dressing applied with a thumb spica splint and a volar component for extension of the digits. Postoperatively, she is to keep it iced and elevated. She is to call if there are any problems and there were no complications.       Ramona Allison MD       [address] [place] [address] / Ladarius Daniels  D: 08/28/2018 19:58     T: 08/28/2018 23:47  JOB #: [id number]

## (undated) DEVICE — X-RAY SPONGES,12 PLY: Brand: DERMACEA

## (undated) DEVICE — LIGHT HANDLE: Brand: DEVON

## (undated) DEVICE — NDL PRT INJ NSAF BLNT 18GX1.5 --

## (undated) DEVICE — 1010 S-DRAPE TOWEL DRAPE 10/BX: Brand: STERI-DRAPE™

## (undated) DEVICE — BIPOLAR FORCEPS CORD,BANANA LEADS: Brand: VALLEYLAB

## (undated) DEVICE — SUTURE FIBERWIRE 2-0 L18IN NONABSORBABLE BLU L17.9MM 3/8 AR7220

## (undated) DEVICE — STERILE POLYISOPRENE POWDER-FREE SURGICAL GLOVES: Brand: PROTEXIS

## (undated) DEVICE — ST BIAS STOCKINETTE: Brand: DEROYAL

## (undated) DEVICE — KENDALL SCD EXPRESS SLEEVES, KNEE LENGTH, MEDIUM: Brand: KENDALL SCD

## (undated) DEVICE — SUTURE MCRYL SZ 3-0 L27IN ABSRB UD L19MM PS-2 3/8 CIR PRIM Y427H

## (undated) DEVICE — SOLUTION IV 1000ML 0.9% SOD CHL

## (undated) DEVICE — PREP SKN CHLRAPRP 26ML TNT -- CONVERT TO ITEM 373320

## (undated) DEVICE — SYR 10ML CTRL LR LCK NSAF LF --

## (undated) DEVICE — GAMMEX® NON-LATEX SIZE 8, STERILE NEOPRENE POWDER-FREE SURGICAL GLOVE: Brand: GAMMEX

## (undated) DEVICE — DRESSING,GAUZE,XEROFORM,CURAD,1"X8",ST: Brand: CURAD

## (undated) DEVICE — BANDAGE COMPR 9 FTX4 IN SMOOTH COMFORTABLE SYNTH ESMRK LF

## (undated) DEVICE — Device

## (undated) DEVICE — NEEDLE HYPO 25GA L1.5IN BVL ORIENTED ECLIPSE

## (undated) DEVICE — KIT PROC EXTRM HND FT CUST LF --

## (undated) DEVICE — BLADE OPHTH 180DEG CUT SURF BLU STR SHRP DBL BVL GRINDLESS

## (undated) DEVICE — DERMACEA GAUZE ROLL: Brand: DERMACEA